# Patient Record
Sex: FEMALE | URBAN - METROPOLITAN AREA
[De-identification: names, ages, dates, MRNs, and addresses within clinical notes are randomized per-mention and may not be internally consistent; named-entity substitution may affect disease eponyms.]

---

## 2020-08-06 ENCOUNTER — APPOINTMENT (RX ONLY)
Dept: URBAN - METROPOLITAN AREA CLINIC 151 | Facility: CLINIC | Age: 40
Setting detail: DERMATOLOGY
End: 2020-08-06

## 2020-08-06 DIAGNOSIS — D18.0 HEMANGIOMA: ICD-10-CM

## 2020-08-06 DIAGNOSIS — L72.0 EPIDERMAL CYST: ICD-10-CM

## 2020-08-06 DIAGNOSIS — L90.8 OTHER ATROPHIC DISORDERS OF SKIN: ICD-10-CM

## 2020-08-06 DIAGNOSIS — L72.11 PILAR CYST: ICD-10-CM

## 2020-08-06 DIAGNOSIS — D22 MELANOCYTIC NEVI: ICD-10-CM

## 2020-08-06 DIAGNOSIS — L81.4 OTHER MELANIN HYPERPIGMENTATION: ICD-10-CM

## 2020-08-06 DIAGNOSIS — L82.1 OTHER SEBORRHEIC KERATOSIS: ICD-10-CM

## 2020-08-06 PROBLEM — D22.4 MELANOCYTIC NEVI OF SCALP AND NECK: Status: ACTIVE | Noted: 2020-08-06

## 2020-08-06 PROBLEM — D22.5 MELANOCYTIC NEVI OF TRUNK: Status: ACTIVE | Noted: 2020-08-06

## 2020-08-06 PROBLEM — D18.01 HEMANGIOMA OF SKIN AND SUBCUTANEOUS TISSUE: Status: ACTIVE | Noted: 2020-08-06

## 2020-08-06 PROCEDURE — 99203 OFFICE O/P NEW LOW 30 MIN: CPT

## 2020-08-06 PROCEDURE — ? DIAGNOSIS COMMENT

## 2020-08-06 PROCEDURE — ? COUNSELING

## 2020-08-06 PROCEDURE — ? PRESCRIPTION SAMPLES PROVIDED

## 2020-08-06 PROCEDURE — ? OBSERVATION AND MEASURE

## 2020-08-06 ASSESSMENT — LOCATION SIMPLE DESCRIPTION DERM
LOCATION SIMPLE: INFERIOR FOREHEAD
LOCATION SIMPLE: LEFT SHOULDER
LOCATION SIMPLE: LEFT UPPER BACK
LOCATION SIMPLE: ABDOMEN
LOCATION SIMPLE: POSTERIOR NECK
LOCATION SIMPLE: RIGHT SHOULDER
LOCATION SIMPLE: LEFT PRETIBIAL REGION
LOCATION SIMPLE: HAIR
LOCATION SIMPLE: CHEST
LOCATION SIMPLE: RIGHT UPPER BACK

## 2020-08-06 ASSESSMENT — LOCATION ZONE DERM
LOCATION ZONE: TRUNK
LOCATION ZONE: NECK
LOCATION ZONE: SCALP
LOCATION ZONE: FACE
LOCATION ZONE: LEG
LOCATION ZONE: ARM

## 2020-08-06 ASSESSMENT — LOCATION DETAILED DESCRIPTION DERM
LOCATION DETAILED: RIGHT SUPERIOR UPPER BACK
LOCATION DETAILED: RIGHT MEDIAL TRAPEZIAL NECK
LOCATION DETAILED: MID POSTERIOR NECK
LOCATION DETAILED: INFERIOR MID FOREHEAD
LOCATION DETAILED: RIGHT MEDIAL UPPER BACK
LOCATION DETAILED: RIGHT LATERAL ABDOMEN
LOCATION DETAILED: RIGHT POSTERIOR SHOULDER
LOCATION DETAILED: HAIR
LOCATION DETAILED: LEFT SUPERIOR MEDIAL UPPER BACK
LOCATION DETAILED: LEFT POSTERIOR NECK
LOCATION DETAILED: LEFT POSTERIOR SHOULDER
LOCATION DETAILED: LEFT LATERAL SUPERIOR CHEST
LOCATION DETAILED: LEFT PROXIMAL PRETIBIAL REGION
LOCATION DETAILED: LEFT SUPERIOR UPPER BACK

## 2020-08-06 NOTE — PROCEDURE: MIPS QUALITY
Quality 226: Preventive Care And Screening: Tobacco Use: Screening And Cessation Intervention: Patient screened for tobacco use and is an ex/non-smoker
Quality 134: Screening For Clinical Depression And Follow-Up Plan: The patient was screened for depression and the screen was negative and no follow up required
Detail Level: Detailed
Quality 431: Preventive Care And Screening: Unhealthy Alcohol Use - Screening: Patient screened for unhealthy alcohol use using a single question and scores less than 2 times per year
Quality 110: Preventive Care And Screening: Influenza Immunization: Influenza Immunization Administered during Influenza season

## 2020-08-06 NOTE — PROCEDURE: DIAGNOSIS COMMENT
Detail Level: Zone
Comment: Pt was interested in retinoids, Botox to forehead, and fillers to nasolabial folds but is currently family planning. We referred patient to Dr. Sarah Mtz after she is done with pregnancy and breastfeeding.
Comment: Referred patient to Dr. Wiley for removal. +Fhx in pt’s father.

## 2020-08-06 NOTE — PROCEDURE: OBSERVATION
Body Location Override (Optional - Billing Will Still Be Based On Selected Body Map Location If Applicable): left frontal scalp
Detail Level: Detailed
Size Of Lesion: 9mm
Morphology Per Location (Optional): skin-colored cystic nodule
Body Location Override (Optional - Billing Will Still Be Based On Selected Body Map Location If Applicable): below vertex
Size Of Lesion: 2cm
Body Location Override (Optional - Billing Will Still Be Based On Selected Body Map Location If Applicable): left crown
Size Of Lesion: 1.5cm
Body Location Override (Optional - Billing Will Still Be Based On Selected Body Map Location If Applicable): left superior occipital scalp
Size Of Lesion: 5mm
Morphology Per Location (Optional): two skin-colored  cystic nodules
Body Location Override (Optional - Billing Will Still Be Based On Selected Body Map Location If Applicable): right midfrontal scalp
Body Location Override (Optional - Billing Will Still Be Based On Selected Body Map Location If Applicable): right medial mid-back
Size Of Lesion: 4mm
Morphology Per Location (Optional): brown macule
Body Location Override (Optional - Billing Will Still Be Based On Selected Body Map Location If Applicable): left upper back
Size Of Lesion: 3mm
Morphology Per Location (Optional): tan macule
Body Location Override (Optional - Billing Will Still Be Based On Selected Body Map Location If Applicable): right abdomen
Size Of Lesion: 2.5mm
Morphology Per Location (Optional): brown tan macules
Body Location Override (Optional - Billing Will Still Be Based On Selected Body Map Location If Applicable): right upper flank
Size Of Lesion: 3.5mm
Morphology Per Location (Optional): thin tan papule
Body Location Override (Optional - Billing Will Still Be Based On Selected Body Map Location If Applicable): left breast
Size Of Lesion: 2mm
Body Location Override (Optional - Billing Will Still Be Based On Selected Body Map Location If Applicable): left popliteal fossa
Morphology Per Location (Optional): pink thin papule
Body Location Override (Optional - Billing Will Still Be Based On Selected Body Map Location If Applicable): left pubis
Body Location Override (Optional - Billing Will Still Be Based On Selected Body Map Location If Applicable): R4 toe, left medial foot
Size Of Lesion: 1mm
Morphology Per Location (Optional): brown macules
Body Location Override (Optional - Billing Will Still Be Based On Selected Body Map Location If Applicable): buttocks, hips, thighs
Size Of Lesion: pinpoint

## 2020-08-06 NOTE — HPI: EVALUATION OF SKIN LESION(S)
What Type Of Note Output Would You Prefer (Optional)?: Standard Output
Hpi Title: Evaluation of Skin Lesions
How Severe Are Your Spot(S)?: mild
Have Your Spot(S) Been Treated In The Past?: has not been treated
Additional History: FBSE, 3 Pilar cysts removed, wrinkles on forehead would like retinol. Has had moles removed on arms and chest all benign. Moles on R flank she’d like evaluated

## 2022-12-05 ENCOUNTER — WEB ENCOUNTER (OUTPATIENT)
Dept: URBAN - METROPOLITAN AREA CLINIC 19 | Facility: CLINIC | Age: 42
End: 2022-12-05

## 2022-12-05 ENCOUNTER — OFFICE VISIT (OUTPATIENT)
Dept: URBAN - METROPOLITAN AREA CLINIC 19 | Facility: CLINIC | Age: 42
End: 2022-12-05
Payer: COMMERCIAL

## 2022-12-05 VITALS
TEMPERATURE: 98.1 F | WEIGHT: 160 LBS | DIASTOLIC BLOOD PRESSURE: 72 MMHG | SYSTOLIC BLOOD PRESSURE: 124 MMHG | BODY MASS INDEX: 24.25 KG/M2 | HEIGHT: 68 IN

## 2022-12-05 DIAGNOSIS — K58.1 IRRITABLE BOWEL SYNDROME WITH CONSTIPATION: ICD-10-CM

## 2022-12-05 DIAGNOSIS — K59.00 CONSTIPATION, UNSPECIFIED CONSTIPATION TYPE: ICD-10-CM

## 2022-12-05 DIAGNOSIS — R14.0 ABDOMINAL BLOATING: ICD-10-CM

## 2022-12-05 DIAGNOSIS — R10.10 PAIN OF UPPER ABDOMEN: ICD-10-CM

## 2022-12-05 PROBLEM — 14760008: Status: ACTIVE | Noted: 2022-12-05

## 2022-12-05 PROCEDURE — 91065 BREATH HYDROGEN/METHANE TEST: CPT | Performed by: NURSE PRACTITIONER

## 2022-12-05 PROCEDURE — 99204 OFFICE O/P NEW MOD 45 MIN: CPT | Performed by: NURSE PRACTITIONER

## 2022-12-05 RX ORDER — SERTRALINE HYDROCHLORIDE 50 MG/1
1 TABLET TABLET, FILM COATED ORAL ONCE A DAY
Status: ACTIVE | COMMUNITY

## 2022-12-05 RX ORDER — HYOSCYAMINE SULFATE 0.12 MG/1
1 TABLET UNDER THE TONGUE AND ALLOW TO DISSOLVE  AS NEEDED TABLET, ORALLY DISINTEGRATING ORAL
Qty: 90 | Refills: 2 | OUTPATIENT
Start: 2022-12-05 | End: 2023-03-05

## 2022-12-05 RX ORDER — PLECANATIDE 3 MG/1
1 TABLET TABLET ORAL ONCE A DAY
Qty: 30 | Refills: 2 | OUTPATIENT
Start: 2022-12-05 | End: 2023-03-05

## 2022-12-05 NOTE — HPI-TODAY'S VISIT:
42-year-old female who presents to the office for upper abdominal pain, and bloating.  She complains of severe abdominal bloating for 5 years and has seen another GI DR in September this year. She had Upper GI study with small bowel follow-through 10/7/2022 normal. She was checked for celiac and was negative.  She wakes up bloated and gets worse in the evenings. She complains of upper abdominal pain x10 years, that only gets improved with belching, fasting. Admits intermittent nausea/vomiting when pain is severe. Denies GERD. She has been taking Prilosec with some improvement.  Stools are every few days, maybe 2-3 times a week, formed/hard, dry rocks. She has tried Miralax with no improvement, has taken Dulcolax and MOM with some improvement. Denies bloody stools.  She has never had EGD/Colon. Denies family hx of colon cancer. Father has Nolan's esophagus.  She is wanting to be tested for SIBO.

## 2022-12-21 ENCOUNTER — TELEPHONE ENCOUNTER (OUTPATIENT)
Dept: URBAN - METROPOLITAN AREA CLINIC 19 | Facility: CLINIC | Age: 42
End: 2022-12-21

## 2022-12-21 RX ORDER — LINACLOTIDE 290 UG/1
1 CAPSULE AT LEAST 30 MINUTES BEFORE THE FIRST MEAL OF THE DAY ON AN EMPTY STOMACH CAPSULE, GELATIN COATED ORAL ONCE A DAY
Qty: 30 | Refills: 2 | OUTPATIENT
Start: 2022-12-26 | End: 2023-03-26

## 2022-12-21 RX ORDER — PLECANATIDE 3 MG/1
1 TABLET TABLET ORAL ONCE A DAY
Qty: 30 | Refills: 2 | Status: ACTIVE | COMMUNITY
Start: 2022-12-05 | End: 2023-03-05

## 2022-12-21 RX ORDER — LACTULOSE 10 G/15ML
15 ML AS NEEDED SOLUTION ORAL ONCE A DAY
Qty: 450 | OUTPATIENT
Start: 2022-12-27 | End: 2023-01-26

## 2022-12-21 RX ORDER — SERTRALINE HYDROCHLORIDE 50 MG/1
1 TABLET TABLET, FILM COATED ORAL ONCE A DAY
Status: ACTIVE | COMMUNITY

## 2022-12-21 RX ORDER — HYOSCYAMINE SULFATE 0.12 MG/1
1 TABLET UNDER THE TONGUE AND ALLOW TO DISSOLVE  AS NEEDED TABLET, ORALLY DISINTEGRATING ORAL
Qty: 90 | Refills: 2 | Status: ACTIVE | COMMUNITY
Start: 2022-12-05 | End: 2023-03-05

## 2022-12-26 PROBLEM — 440630006: Status: ACTIVE | Noted: 2022-12-05

## 2023-01-18 ENCOUNTER — DASHBOARD ENCOUNTERS (OUTPATIENT)
Age: 43
End: 2023-01-18

## 2023-01-20 ENCOUNTER — OFFICE VISIT (OUTPATIENT)
Dept: URBAN - METROPOLITAN AREA CLINIC 19 | Facility: CLINIC | Age: 43
End: 2023-01-20

## 2023-02-03 ENCOUNTER — OFFICE VISIT (OUTPATIENT)
Dept: URBAN - METROPOLITAN AREA CLINIC 19 | Facility: CLINIC | Age: 43
End: 2023-02-03

## 2023-02-03 NOTE — HPI-TODAY'S VISIT:
12/5/2022   42-year-old female who presents to the office for upper abdominal pain, and bloating.  She complains of severe abdominal bloating for 5 years and has seen another GI DR in September this year. She had Upper GI study with small bowel follow-through 10/7/2022 normal. She was checked for celiac and was negative.  She wakes up bloated and gets worse in the evenings. She complains of upper abdominal pain x10 years, that only gets improved with belching, fasting. Admits intermittent nausea/vomiting when pain is severe. Denies GERD. She has been taking Prilosec with some improvement.  Stools are every few days, maybe 2-3 times a week, formed/hard, dry rocks. She has tried Miralax with no improvement, has taken Dulcolax and MOM with some improvement. Denies bloody stools.  She has never had EGD/Colon. Denies family hx of colon cancer. Father has Nolan's esophagus.  She is wanting to be tested for SIBO. Plan Levsin as needed, Levsin, GAS X, Low fodmap diet, Trulance, SIBO test, consider Xifaxan/colonoscopy/EGD  TODAY Trulance and Linzess are both too expensive sent in lactulose SIBO test not completed

## 2023-12-28 ENCOUNTER — APPOINTMENT (RX ONLY)
Dept: URBAN - METROPOLITAN AREA CLINIC 159 | Facility: CLINIC | Age: 43
Setting detail: DERMATOLOGY
End: 2023-12-28

## 2023-12-28 DIAGNOSIS — D49.2 NEOPLASM OF UNSPECIFIED BEHAVIOR OF BONE, SOFT TISSUE, AND SKIN: ICD-10-CM

## 2023-12-28 DIAGNOSIS — L05.91 PILONIDAL CYST WITHOUT ABSCESS: ICD-10-CM | Status: INADEQUATELY CONTROLLED

## 2023-12-28 PROCEDURE — ? PRESCRIPTION

## 2023-12-28 PROCEDURE — ? COUNSELING

## 2023-12-28 PROCEDURE — ? CONSULTATION FOR EXCISION

## 2023-12-28 PROCEDURE — 99204 OFFICE O/P NEW MOD 45 MIN: CPT

## 2023-12-28 PROCEDURE — ? SURGICAL DECISION MAKING

## 2023-12-28 PROCEDURE — ? TREATMENT REGIMEN

## 2023-12-28 RX ORDER — CEPHALEXIN 500 MG/1
TABLET ORAL BID
Qty: 28 | Refills: 0 | Status: ERX

## 2023-12-28 ASSESSMENT — LOCATION DETAILED DESCRIPTION DERM
LOCATION DETAILED: RIGHT SUPERIOR PARIETAL SCALP
LOCATION DETAILED: SACRAL SPINE
LOCATION DETAILED: MID-OCCIPITAL SCALP
LOCATION DETAILED: LEFT CENTRAL FRONTAL SCALP
LOCATION DETAILED: RIGHT SUPERIOR OCCIPITAL SCALP

## 2023-12-28 ASSESSMENT — LOCATION ZONE DERM
LOCATION ZONE: TRUNK
LOCATION ZONE: SCALP

## 2023-12-28 ASSESSMENT — LOCATION SIMPLE DESCRIPTION DERM
LOCATION SIMPLE: LEFT SCALP
LOCATION SIMPLE: RIGHT OCCIPITAL SCALP
LOCATION SIMPLE: LOWER BACK
LOCATION SIMPLE: SCALP
LOCATION SIMPLE: POSTERIOR SCALP

## 2023-12-28 NOTE — HPI: CYST
Is This A New Presentation, Or A Follow-Up?: Cysts
How Severe Is Your Cyst?: mild
Additional History: Patient states she has had on the back buttocks drained twice in the past and that helped with discomfort. Patient states she is currently doing warm compresses which was helpful in the past but she isn't sure it is as effective. Patient id looking to get a second opinion today. \\nPatient states she has had cyst removed in the past on her scalp but they just keep coming back getting inflamed then they resolve and move to another location. Patient states they are very irritated today and sometime painful.\\nPatient recently finished a 14 day course of antibiotics and doesn't feel like it was effective as she just dosen't feel to well.

## 2023-12-28 NOTE — PROCEDURE: TREATMENT REGIMEN
Detail Level: Zone
Initiate Treatment: cephalexin 500 mg tablet Take one pill two times a day for 14 days ( patient to decide if she wants to take antibiotic)
Plan: Recommend patient follow up with general surgery for removal due to symptoms (patient provided with Surgeon information today) \\nI&D not recommended today based on presentation\\nWarm compresses

## 2024-08-14 ENCOUNTER — APPOINTMENT (RX ONLY)
Dept: URBAN - METROPOLITAN AREA CLINIC 159 | Facility: CLINIC | Age: 44
Setting detail: DERMATOLOGY
End: 2024-08-14

## 2024-08-14 DIAGNOSIS — B85.0 PEDICULOSIS DUE TO PEDICULUS HUMANUS CAPITIS: ICD-10-CM | Status: WORSENING

## 2024-08-14 DIAGNOSIS — D18.0 HEMANGIOMA: ICD-10-CM

## 2024-08-14 DIAGNOSIS — L81.4 OTHER MELANIN HYPERPIGMENTATION: ICD-10-CM

## 2024-08-14 DIAGNOSIS — D22 MELANOCYTIC NEVI: ICD-10-CM

## 2024-08-14 DIAGNOSIS — L82.1 OTHER SEBORRHEIC KERATOSIS: ICD-10-CM

## 2024-08-14 DIAGNOSIS — L30.8 OTHER SPECIFIED DERMATITIS: ICD-10-CM

## 2024-08-14 PROBLEM — D18.01 HEMANGIOMA OF SKIN AND SUBCUTANEOUS TISSUE: Status: ACTIVE | Noted: 2024-08-14

## 2024-08-14 PROBLEM — D22.5 MELANOCYTIC NEVI OF TRUNK: Status: ACTIVE | Noted: 2024-08-14

## 2024-08-14 PROCEDURE — 99213 OFFICE O/P EST LOW 20 MIN: CPT

## 2024-08-14 PROCEDURE — ? PRESCRIPTION

## 2024-08-14 PROCEDURE — ? COUNSELING

## 2024-08-14 RX ORDER — SPINOSAD 9 MG/ML
SUSPENSION TOPICAL
Qty: 120 | Refills: 5 | Status: ERX | COMMUNITY
Start: 2024-08-14

## 2024-08-14 RX ADMIN — SPINOSAD: 9 SUSPENSION TOPICAL at 00:00

## 2024-08-14 ASSESSMENT — LOCATION DETAILED DESCRIPTION DERM
LOCATION DETAILED: MID-FRONTAL SCALP
LOCATION DETAILED: INFERIOR THORACIC SPINE
LOCATION DETAILED: RIGHT MEDIAL UPPER BACK
LOCATION DETAILED: LEFT MEDIAL UPPER BACK
LOCATION DETAILED: LEFT PROXIMAL CALF

## 2024-08-14 ASSESSMENT — LOCATION ZONE DERM
LOCATION ZONE: LEG
LOCATION ZONE: SCALP
LOCATION ZONE: TRUNK

## 2024-08-14 ASSESSMENT — LOCATION SIMPLE DESCRIPTION DERM
LOCATION SIMPLE: LEFT CALF
LOCATION SIMPLE: RIGHT UPPER BACK
LOCATION SIMPLE: ANTERIOR SCALP
LOCATION SIMPLE: UPPER BACK
LOCATION SIMPLE: LEFT UPPER BACK

## 2024-08-14 NOTE — HPI: FULL BODY SKIN EXAMINATION
What Is The Reason For Today's Visit?: Full Body Skin Examination
What Is The Reason For Today's Visit? (Being Monitored For X): concerning skin lesions on an annual basis
Additional History: C/o spot on neck that has been present for years and spots on toes that have popped up over past few years.